# Patient Record
Sex: MALE | Race: WHITE | URBAN - METROPOLITAN AREA
[De-identification: names, ages, dates, MRNs, and addresses within clinical notes are randomized per-mention and may not be internally consistent; named-entity substitution may affect disease eponyms.]

---

## 2024-09-09 ENCOUNTER — APPOINTMENT (RX ONLY)
Dept: URBAN - METROPOLITAN AREA CLINIC 312 | Facility: CLINIC | Age: 74
Setting detail: DERMATOLOGY
End: 2024-09-09

## 2024-09-09 DIAGNOSIS — L30.9 DERMATITIS, UNSPECIFIED: ICD-10-CM

## 2024-09-09 PROCEDURE — ? COUNSELING

## 2024-09-09 PROCEDURE — ? TREATMENT REGIMEN

## 2024-09-09 PROCEDURE — ? PRESCRIPTION MEDICATION MANAGEMENT

## 2024-09-09 PROCEDURE — ? PRESCRIPTION

## 2024-09-09 PROCEDURE — 99203 OFFICE O/P NEW LOW 30 MIN: CPT

## 2024-09-09 RX ORDER — PREDNISONE 10 MG/1
TABLET ORAL
Qty: 52 | Refills: 0 | Status: ERX | COMMUNITY
Start: 2024-09-09

## 2024-09-09 RX ADMIN — PREDNISONE: 10 TABLET ORAL at 00:00

## 2024-09-09 NOTE — HPI: OTHER
Condition:: Sensitive skin
Please Describe Your Condition:: Pt states that his skin has been thinner and more sensitive to heat in the past few years. Pt has been sweating and woke up with a rash last Thursday; also had an ache that felt like an awful sunburn. Pt also reports a tremendous itch that wakes him up. Pt has tried Cerave and Sarna. Sarna helps more than Cerave but it is hard to get on his back. Pt states that his condition is improving but nothing has completely taken the problem away. Pt reports bumps still left over from the rash. Pt had a hearing exam a short while ago and was told to use Claritin and Nasacort and thats when he broke out with the rash 3 days later.

## 2024-09-09 NOTE — PROCEDURE: PRESCRIPTION MEDICATION MANAGEMENT
Detail Level: Zone
Initiate Treatment: Prednisone, take 60mg for 4 days then 40mg for 4 days then 20 for 4 days and then 10mg for 4 days.
Render In Strict Bullet Format?: No